# Patient Record
Sex: FEMALE | Race: AMERICAN INDIAN OR ALASKA NATIVE
[De-identification: names, ages, dates, MRNs, and addresses within clinical notes are randomized per-mention and may not be internally consistent; named-entity substitution may affect disease eponyms.]

---

## 2017-09-19 NOTE — MAMMOGRAPHY REPORT
BILATERAL DIGITAL SCREENING MAMMOGRAM with CAD : 09/19/17 11:32:00



CLINICAL: Routine screening.



COMPARISON:09/18/16



FINDINGS: The breasts are heterogeneously dense, which may obscure 

small masses. No mass, architectural distortion or suspicious 

calcifications.



IMPRESSION: No mammographic evidence of malignancy.



BI-RADS CATEGORY:  2 -- Benign



RECOMMENDATION: Routine mammographic screening in one year.



COMMENT:

Patient follow-up letters are generated by our [email or web address] application.

## 2019-04-22 ENCOUNTER — HOSPITAL ENCOUNTER (OUTPATIENT)
Dept: HOSPITAL 5 - SPVWC | Age: 44
Discharge: HOME | End: 2019-04-22
Attending: OBSTETRICS & GYNECOLOGY
Payer: COMMERCIAL

## 2019-04-22 DIAGNOSIS — Z12.31: Primary | ICD-10-CM

## 2019-04-22 PROCEDURE — 77067 SCR MAMMO BI INCL CAD: CPT

## 2019-04-22 PROCEDURE — 77063 BREAST TOMOSYNTHESIS BI: CPT

## 2019-04-24 NOTE — MAMMOGRAPHY REPORT
BILATERAL DIGITAL SCREENING MAMMOGRAM with CAD and  BILATERAL DIGITAL 

BREAST TOMOSYNTHESIS (DBT) : 04/22/19 13:45:00



CLINICAL: Routine screening.



COMPARISON:09/19/17 and 09/16/16



FINDINGS: The breasts are heterogeneously dense, which may obscure 

small masses.  A left outer asymmetry on the CC digital breast 

tomosynthesis (DBT) image 32 requires additional imaging.No correlation 

on the MLO series. No architectural distortion or suspicious 

calcifications.  The right breast is negative.



IMPRESSION:  Left outer asymmetry requiring additional workup.





BI-RADS CATEGORY:    0 - - Needs Additional Imaging



RECOMMENDATION:  Recall for left ML and spot compression CC views and 

left breast ultrasound if needed.



COMMENT:

Patient follow-up letters are generated by our [employer] application.

## 2019-04-24 NOTE — MAMMOGRAPHY REPORT
BILATERAL DIGITAL SCREENING MAMMOGRAM with CAD and  BILATERAL DIGITAL 

BREAST TOMOSYNTHESIS (DBT) : 04/22/19 13:45:00



CLINICAL: Routine screening.



COMPARISON:09/19/17 and 09/16/16



FINDINGS: The breasts are heterogeneously dense, which may obscure 

small masses.  A left outer asymmetry on the CC digital breast 

tomosynthesis (DBT) image 32 requires additional imaging.No correlation 

on the MLO series. No architectural distortion or suspicious 

calcifications.  The right breast is negative.



IMPRESSION:  Left outer asymmetry requiring additional workup.





BI-RADS CATEGORY:    0 - - Needs Additional Imaging



RECOMMENDATION:  Recall for left ML and spot compression CC views and 

left breast ultrasound if needed.



COMMENT:

Patient follow-up letters are generated by our [employer] application.

## 2019-10-08 ENCOUNTER — HOSPITAL ENCOUNTER (OUTPATIENT)
Dept: HOSPITAL 5 - SPVWC | Age: 44
Discharge: HOME | End: 2019-10-08
Attending: SURGERY
Payer: COMMERCIAL

## 2019-10-08 DIAGNOSIS — R92.8: Primary | ICD-10-CM

## 2019-10-08 NOTE — MAMMOGRAPHY REPORT
DIGITAL DIAGNOSTIC MAMMOGRAM WITH CAD, 10/8/2019



INDICATION: Follow-up asymmetry. 



TECHNIQUE:  Digital left mammographic imaging was performed.

This examination was interpreted with the benefit of Computer-aided Detection analysis. 



COMPARISON: 4/22/2019 and 5/9/2019



FINDINGS: 



Breast Density: The breast is heterogeneously dense, which may obscure small masses. 



There is no evidence of dominant mass, suspicious calcifications or architectural distortion in the l
eft breast. The previously identified outer asymmetry is smaller compared to previous exams.



IMPRESSION: No mammographic evidence of malignancy.



Follow up recommendation: Routine yearly



BI-RADS Category 2:  Benign.



A "normal" or negative report should not discourage follow up or biopsy of a clinically significant f
inding.



A written summary of these findings will be mailed to the patient. The patient will be entered into a
 mammography reporting system which will generate a reminder letter for the patient's next appointmen
t at the appropriate interval.



According to the [employer], yearly mammograms are recommended starting at age 40 
and continuing as long as a woman is in good health.  Breast MRI is recommended for women with an alexis
roximately 20-25% or greater lifetime risk of breast cancer, including women with a strong family his
tory of breast or ovarian cancer and women who have been treated for Hodgkin's disease.



Signer Name: Luis Roth MD 

Signed: 10/8/2019 4:04 PM

 Workstation Name: [id number]

## 2020-05-26 ENCOUNTER — HOSPITAL ENCOUNTER (OUTPATIENT)
Dept: HOSPITAL 5 - SPVWC | Age: 45
Discharge: HOME | End: 2020-05-26
Attending: SURGERY
Payer: COMMERCIAL

## 2020-05-26 DIAGNOSIS — Z12.31: Primary | ICD-10-CM

## 2020-05-26 PROCEDURE — 77067 SCR MAMMO BI INCL CAD: CPT

## 2020-05-26 NOTE — MAMMOGRAPHY REPORT
DIGITAL SCREENING MAMMOGRAM WITH CAD, 5/26/2020



INDICATION: Routine screening mammography. 



TECHNIQUE:  Digital bilateral  2D mammography was obtained in the craniocaudal and mediolateral obliq
ue projections. This examination was interpreted with the benefit of Computer-Aided Detection analysi
s.



COMPARISON: 9/16/2016, 10/8/2019, 4/22/2019



FINDINGS: 



Breast Density: The breasts are heterogeneously dense, which may obscure small masses.



There is no evidence of dominant mass, suspicious calcifications or architectural distortion in eithe
r breast. There is a stable cluster of benign calcification in the central right breast. Overall, no 
interval change in the appearance of the mammogram.



IMPRESSION: No evidence of malignancy.



Follow up recommendation: Routine yearly



BI-RADS Category 2:  Benign.



A "normal" or negative report should not discourage follow up or biopsy of a clinically significant f
inding.



A written summary of these findings will be mailed to the patient. The patient will be entered into a
 mammography reporting system which will generate a reminder letter for the patient's next appointmen
t at the appropriate interval.



The [employer] recommends yearly mammograms starting at age 40 and continuing as l
kalin as a woman is in good health.  Breast MRI is recommended for women with an approximate 20-25% or 
greater lifetime risk of breast cancer, including women with a strong family history of breast or ova
magdalena cancer or who have been treated for Hodgkin's disease.



Signer Name: Adelaide Kincaid MD 

Signed: 5/26/2020 8:44 AM

Workstation Name: [id number]

## 2021-06-01 ENCOUNTER — HOSPITAL ENCOUNTER (OUTPATIENT)
Dept: HOSPITAL 5 - SPVWC | Age: 46
Discharge: HOME | End: 2021-06-01
Attending: SURGERY
Payer: COMMERCIAL

## 2021-06-01 DIAGNOSIS — Z12.31: Primary | ICD-10-CM

## 2021-06-01 PROCEDURE — 77067 SCR MAMMO BI INCL CAD: CPT

## 2021-06-01 NOTE — MAMMOGRAPHY REPORT
DIGITAL SCREENING MAMMOGRAM WITH CAD, 6/1/2021



CLINICAL INFORMATION / INDICATION: Routine screening mammography.



TECHNIQUE:  Digital bilateral 2D mammography was obtained in the craniocaudal and mediolateral obliqu
e projections. This examination was interpreted with the benefit of Computer-Aided Detection analysis
.



COMPARISON: 5/26/2020, 10/8/2019, 5/9/2019, 4/22/2019, 9/19/2017



FINDINGS: 



Breast Density: The breasts are heterogeneously dense, which may obscure small masses.



No dominant mass, suspicious calcifications, or architectural distortion in either breast. 





 

IMPRESSION: No mammographic evidence of malignancy.



Follow up recommendation: Routine yearly



BI-RADS Category 1:  Negative.





-------------------------------------------------------------------------------------------

A "normal" or negative report should not discourage follow up or biopsy of a clinically significant f
inding.



A written summary of these findings will be mailed to the patient. The patient will be entered into a
 mammography reporting system which will generate a reminder letter for the patient's next appointmen
t at the appropriate interval.



The [employer] recommends yearly mammograms starting at age 40 and continuing as l
kalin as a woman is in good health.  Breast MRI is recommended for women with an approximate 20-25% or 
greater lifetime risk of breast cancer, including women with a strong family history of breast or ova
magdal[place] cancer or who have been treated for Hodgkin's disease.



Signer Name: Demi Padilla MD 

Signed: 6/1/2021 9:25 AM

Workstation Name: [email or web address]